# Patient Record
Sex: MALE | Race: WHITE | URBAN - METROPOLITAN AREA
[De-identification: names, ages, dates, MRNs, and addresses within clinical notes are randomized per-mention and may not be internally consistent; named-entity substitution may affect disease eponyms.]

---

## 2024-04-29 ENCOUNTER — TRANSCRIBE ORDERS (OUTPATIENT)
Dept: ADMINISTRATIVE | Facility: HOSPITAL | Age: 69
End: 2024-04-29

## 2024-04-29 DIAGNOSIS — Z82.49 FAMILY HISTORY OF ISCHEMIC HEART DISEASE: Primary | ICD-10-CM

## 2024-04-29 DIAGNOSIS — I10 ESSENTIAL HYPERTENSION, MALIGNANT: ICD-10-CM

## 2024-07-10 ENCOUNTER — HOSPITAL ENCOUNTER (OUTPATIENT)
Dept: CT IMAGING | Facility: HOSPITAL | Age: 69
Discharge: HOME | End: 2024-07-10

## 2024-07-10 ENCOUNTER — HOSPITAL ENCOUNTER (OUTPATIENT)
Dept: CARDIOLOGY | Facility: HOSPITAL | Age: 69
Discharge: HOME | End: 2024-07-10

## 2024-07-10 DIAGNOSIS — Z82.49 FAMILY HISTORY OF ISCHEMIC HEART DISEASE: ICD-10-CM

## 2024-07-10 DIAGNOSIS — I10 ESSENTIAL HYPERTENSION, MALIGNANT: ICD-10-CM

## 2024-07-10 LAB
BH CV VAS SCREENING CAROTID CCA LEFT: 71 CM/SEC
BH CV VAS SCREENING CAROTID CCA RIGHT: 79 CM/SEC
BH CV VAS SCREENING CAROTID ICA LEFT: 116 CM/SEC
BH CV VAS SCREENING CAROTID ICA RIGHT: 82 CM/SEC
BH CV XLRA MEAS - MID AO DIAM: 2.2 CM
BH CV XLRA MEAS - PAD LEFT ABI PT: 0.96
BH CV XLRA MEAS - PAD LEFT ARM: 142 MMHG
BH CV XLRA MEAS - PAD LEFT LEG PT: 137 MMHG
BH CV XLRA MEAS - PAD RIGHT ABI PT: 1.05
BH CV XLRA MEAS - PAD RIGHT ARM: 136 MMHG
BH CV XLRA MEAS - PAD RIGHT LEG PT: 149 MMHG
BH CV XLRA MEAS LEFT DIST CCA EDV: -25.2 CM/SEC
BH CV XLRA MEAS LEFT DIST CCA PSV: -71.3 CM/SEC
BH CV XLRA MEAS LEFT ICA/CCA RATIO: 1.6
BH CV XLRA MEAS LEFT PROX ICA EDV: -34.2 CM/SEC
BH CV XLRA MEAS LEFT PROX ICA PSV: -116 CM/SEC
BH CV XLRA MEAS RIGHT DIST CCA EDV: 21.7 CM/SEC
BH CV XLRA MEAS RIGHT DIST CCA PSV: 78.9 CM/SEC
BH CV XLRA MEAS RIGHT ICA/CCA RATIO: 1
BH CV XLRA MEAS RIGHT PROX ICA EDV: -25.2 CM/SEC
BH CV XLRA MEAS RIGHT PROX ICA PSV: -81.8 CM/SEC

## 2024-07-10 PROCEDURE — 75571 CT HRT W/O DYE W/CA TEST: CPT

## 2024-07-10 PROCEDURE — 93799 UNLISTED CV SVC/PROCEDURE: CPT

## 2024-07-22 ENCOUNTER — PATIENT ROUNDING (BHMG ONLY) (OUTPATIENT)
Dept: CARDIOLOGY | Facility: CLINIC | Age: 69
End: 2024-07-22

## 2024-07-22 ENCOUNTER — OFFICE VISIT (OUTPATIENT)
Dept: CARDIOLOGY | Facility: CLINIC | Age: 69
End: 2024-07-22
Payer: MEDICARE

## 2024-07-22 VITALS
HEART RATE: 64 BPM | DIASTOLIC BLOOD PRESSURE: 76 MMHG | BODY MASS INDEX: 25.62 KG/M2 | SYSTOLIC BLOOD PRESSURE: 117 MMHG | WEIGHT: 183 LBS | HEIGHT: 71 IN | OXYGEN SATURATION: 96 %

## 2024-07-22 DIAGNOSIS — I73.9 PAD (PERIPHERAL ARTERY DISEASE): ICD-10-CM

## 2024-07-22 DIAGNOSIS — R73.03 PREDIABETES: ICD-10-CM

## 2024-07-22 DIAGNOSIS — R73.9 HYPERGLYCEMIA: ICD-10-CM

## 2024-07-22 DIAGNOSIS — E78.5 DYSLIPIDEMIA: ICD-10-CM

## 2024-07-22 DIAGNOSIS — R06.09 DYSPNEA ON EXERTION: ICD-10-CM

## 2024-07-22 DIAGNOSIS — I25.10 CORONARY ARTERY CALCIFICATION SEEN ON CT SCAN: Primary | ICD-10-CM

## 2024-07-22 PROCEDURE — 1159F MED LIST DOCD IN RCRD: CPT | Performed by: INTERNAL MEDICINE

## 2024-07-22 PROCEDURE — 99204 OFFICE O/P NEW MOD 45 MIN: CPT | Performed by: INTERNAL MEDICINE

## 2024-07-22 PROCEDURE — 1160F RVW MEDS BY RX/DR IN RCRD: CPT | Performed by: INTERNAL MEDICINE

## 2024-07-22 PROCEDURE — 93000 ELECTROCARDIOGRAM COMPLETE: CPT | Performed by: INTERNAL MEDICINE

## 2024-07-22 RX ORDER — LOVASTATIN 20 MG/1
20 TABLET ORAL DAILY
COMMUNITY

## 2024-07-22 RX ORDER — ASPIRIN 81 MG/1
81 TABLET ORAL DAILY
COMMUNITY

## 2024-07-22 RX ORDER — GLUCOSAMINE SULFATE 500 MG
CAPSULE ORAL
COMMUNITY

## 2024-07-22 NOTE — PROGRESS NOTES
Cardiology Consult Note    Patient Identification:  Name: Jayden Richmond  Age: 68 y.o.  Sex: male  :  1955  MRN: [id number]             Requesting Physician :  Getachew Maxwell MD     Reason for Consultation / Chief Complaint :   CAD by CT calcium    History of Present Illness:      Mr. Jayden Garcia has PMH of    DJD  Hyperglycemia, prediabetes with hemoglobin A1c 6.3 according to patient  Dyslipidemia  Former smoker quit 1987    Here for abnormal CT calcium score.  Patient denies any chest pain.  Works as a [employer] in [employer].  States that he gets out of breath with extreme exercises which he used to not get when he was younger.    Patient's arterial blood pressure is 117/76, heart rate 64, O2 sat of 96% on room air.    Review of records:    CT calcium score 7/10/2024 revealed left main 0, LAD 94.5, LCx 217, RCA 0, total of 311.5.  2 nodules on right lower lobe along the fissures.    Vascular screening in 7/10/2024 revealed mild bilateral internal carotid artery disease      ECG 12 Lead    Date/Time: 2024 3:01 PM  Performed by: Jose Ramon Palmer MD    Authorized by: Jose Ramon Palmer MD  Comparison: not compared with previous ECG   Previous ECG: no previous ECG available  Comments: EKG done today reviewed/interpreted by me reveals sinus rhythm with a rate of 61 bpm, no comparison EKG available.           Labs from 3/18/2021 reveal BMP with a glucose of 105.  Normal CBC      Assessment:  :    CAD by CT calcium  Dyslipidemia  PAD with mild CAD artery disease  Hyperglycemia/prediabetes      Recommendations / Plan:        Reviewed records and reviewed results with patient.  Continue aspirin and statins as tolerated.  Get labs from PMD.  Follow-up with PMD for dyslipidemia and hyperglycemia  Will schedule a stress test to evaluate functional significance of CAD.  Will follow-up and consider further evaluation treatment depending on how his condition evolves.            Diagnosis Plan   1. Coronary artery calcification seen on CT scan  ECG 12 Lead    Stress Test With Myocardial Perfusion One Day      2. Dyspnea on exertion  Stress Test With Myocardial Perfusion One Day      3. Dyslipidemia  ECG 12 Lead    Stress Test With Myocardial Perfusion One Day      4. Hyperglycemia  ECG 12 Lead    Stress Test With Myocardial Perfusion One Day      5. PAD (peripheral artery disease)        6. Prediabetes                   Past Medical History:  Past Medical History:   Diagnosis Date    Diabetes mellitus     Hyperlipidemia      Past Surgical History:  Past Surgical History:   Procedure Laterality Date    BACK SURGERY      x 8    NOSE SURGERY      TOTAL HIP ARTHROPLASTY REVISION Bilateral       Allergies:  No Known Allergies  Home Meds:       Current Meds:     Current Outpatient Medications:     aspirin 81 MG EC tablet, Take 1 tablet by mouth Daily., Disp: , Rfl:     Glucosamine 500 MG capsule, Glucosamine, Disp: , Rfl:     lovastatin (MEVACOR) 20 MG tablet, Take 1 tablet by mouth Daily., Disp: , Rfl:   Social History:   Social History     Tobacco Use    Smoking status: Former     Current packs/day: 0.00     Average packs/day: 1 pack/day for 14.3 years (14.3 ttl pk-yrs)     Types: Cigarettes     Start date: 1973     Quit date: 1987     Years since quittin.1     Passive exposure: Past    Smokeless tobacco: Never    Tobacco comments:     it was around 12 to 14 years   Substance Use Topics    Alcohol use: Not Currently      Family History:  Family History   Problem Relation Age of Onset    Heart disease Mother         passed away at 79 with stroke    Heart attack Father         open heart about 12 years before falling and hit head and passed from the fall    Heart disease Father     Heart failure Father         Review of Systems : Review of Systems   Constitutional: Negative for fever and malaise/fatigue.   HENT:  Negative for congestion and hearing loss.    Eyes:  Negative for double  "vision and visual disturbance.   Cardiovascular:  Negative for chest pain, claudication, dyspnea on exertion, leg swelling, palpitations and syncope.   Respiratory:  Negative for cough and shortness of breath.    Endocrine: Negative for cold intolerance.   Skin:  Negative for color change and rash.   Musculoskeletal:  Negative for arthritis and joint pain.   Gastrointestinal:  Negative for abdominal pain and heartburn.   Genitourinary:  Negative for hematuria.   Neurological:  Negative for excessive daytime sleepiness and dizziness.   Psychiatric/Behavioral:  Negative for depression. The patient is not nervous/anxious.    All other systems reviewed and are negative.                Constitutional:  Heart Rate:  [64] 64  BP: (117)/(76) 117/76    Physical Exam   /76   Pulse 64   Ht 180.3 cm (71\")   Wt 83 kg (183 lb)   SpO2 96%   BMI 25.52 kg/m²   Physical Exam  General:  Appears in no acute distress  Eyes: Sclerae are anicteric,  conjunctivae are clear   HEENT:  No JVD. Thyroid not visibly enlarged. No mucosal pallor or cyanosis  Respiratory: Respirations regular and unlabored at rest.  Bilaterally good breath sounds with good air entry in all fields. No crackles, rubs or wheezes auscultated  Cardiovascular: S1,S2 Regular rate and rhythm. No murmur, rub or gallop auscultated. No pretibial pitting edema  Gastrointestinal: Abdomen soft, flat, nontender. Bowel sounds present.   Musculoskeletal:  No abnormal movements  Extremities: No digital clubbing or cyanosis  Skin: Color pink. Skin warm and dry to touch. No rashes  No xanthoma  Neuro: Alert and awake, no lateralizing deficits appreciated    Cardiographics  ECG: EKG tracing was  personally reviewed/interpreted by me  ECG 12 Lead    (Results Pending)       Telemetry:      Echocardiogram:       Imaging  Chest X-ray:   Imaging Results (Last 24 Hours)       ** No results found for the last 24 hours. **            Lab Review: I have reviewed the labs            "                           Jose Ramon Palmer MD  7/22/2024, 15:27 EDT      EMR Dragon/Transcription:   Dictated utilizing Dragon dictation

## 2024-07-22 NOTE — PROGRESS NOTES
A My-Chart message has been sent to the patient for PATIENT ROUNDING with [facility]

## 2024-07-31 ENCOUNTER — HOSPITAL ENCOUNTER (OUTPATIENT)
Dept: CARDIOLOGY | Facility: HOSPITAL | Age: 69
Discharge: HOME | End: 2024-07-31
Payer: MEDICARE

## 2024-07-31 ENCOUNTER — TELEPHONE (OUTPATIENT)
Dept: CARDIOLOGY | Facility: CLINIC | Age: 69
End: 2024-07-31
Payer: MEDICARE

## 2024-07-31 DIAGNOSIS — R73.9 HYPERGLYCEMIA: ICD-10-CM

## 2024-07-31 DIAGNOSIS — R06.09 DYSPNEA ON EXERTION: ICD-10-CM

## 2024-07-31 DIAGNOSIS — E78.5 DYSLIPIDEMIA: ICD-10-CM

## 2024-07-31 DIAGNOSIS — I25.10 CORONARY ARTERY CALCIFICATION SEEN ON CT SCAN: ICD-10-CM

## 2024-07-31 LAB
BH CV REST NUCLEAR ISOTOPE DOSE: 10.6 MCI
BH CV STRESS BP STAGE 1: NORMAL
BH CV STRESS BP STAGE 2: NORMAL
BH CV STRESS BP STAGE 3: NORMAL
BH CV STRESS BP STAGE 4: NORMAL
BH CV STRESS DURATION MIN STAGE 1: 3
BH CV STRESS DURATION MIN STAGE 2: 3
BH CV STRESS DURATION MIN STAGE 3: 3
BH CV STRESS DURATION MIN STAGE 4: 0
BH CV STRESS DURATION SEC STAGE 1: 0
BH CV STRESS DURATION SEC STAGE 2: 0
BH CV STRESS DURATION SEC STAGE 3: 0
BH CV STRESS DURATION SEC STAGE 4: 30
BH CV STRESS GRADE STAGE 1: 10
BH CV STRESS GRADE STAGE 2: 12
BH CV STRESS GRADE STAGE 3: 14
BH CV STRESS GRADE STAGE 4: 16
BH CV STRESS HR STAGE 1: 97
BH CV STRESS HR STAGE 2: 108
BH CV STRESS HR STAGE 3: 126
BH CV STRESS HR STAGE 4: 142
BH CV STRESS METS STAGE 1: 5
BH CV STRESS METS STAGE 2: 7.5
BH CV STRESS METS STAGE 3: 10
BH CV STRESS METS STAGE 4: 13.5
BH CV STRESS NUCLEAR ISOTOPE DOSE: 30 MCI
BH CV STRESS PROTOCOL 1: NORMAL
BH CV STRESS RECOVERY BP: NORMAL MMHG
BH CV STRESS RECOVERY HR: 90 BPM
BH CV STRESS SPEED STAGE 1: 1.7
BH CV STRESS SPEED STAGE 2: 2.5
BH CV STRESS SPEED STAGE 3: 3.4
BH CV STRESS SPEED STAGE 4: 4.2
BH CV STRESS STAGE 1: 1
BH CV STRESS STAGE 2: 2
BH CV STRESS STAGE 3: 3
BH CV STRESS STAGE 4: 4
LV EF NUC BP: 58 %
MAXIMAL PREDICTED HEART RATE: 152 BPM
PERCENT MAX PREDICTED HR: 93.42 %
STRESS BASELINE BP: NORMAL MMHG
STRESS BASELINE HR: 56 BPM
STRESS PERCENT HR: 110 %
STRESS POST EXERCISE DUR MIN: 9 MIN
STRESS POST EXERCISE DUR SEC: 30 SEC
STRESS POST PEAK BP: NORMAL MMHG
STRESS POST PEAK HR: 142 BPM
STRESS TARGET HR: 129 BPM

## 2024-07-31 PROCEDURE — 0 TECHNETIUM SESTAMIBI: Performed by: INTERNAL MEDICINE

## 2024-07-31 PROCEDURE — 93017 CV STRESS TEST TRACING ONLY: CPT

## 2024-07-31 PROCEDURE — A9500 TC99M SESTAMIBI: HCPCS | Performed by: INTERNAL MEDICINE

## 2024-07-31 PROCEDURE — 78452 HT MUSCLE IMAGE SPECT MULT: CPT

## 2024-07-31 RX ADMIN — TECHNETIUM TC 99M SESTAMIBI 1 DOSE: 1 INJECTION INTRAVENOUS at 11:17

## 2024-07-31 RX ADMIN — TECHNETIUM TC 99M SESTAMIBI 1 DOSE: 1 INJECTION INTRAVENOUS at 08:30

## 2025-08-27 ENCOUNTER — OFFICE VISIT (OUTPATIENT)
Dept: CARDIOLOGY | Facility: CLINIC | Age: 70
End: 2025-08-27
Payer: MEDICARE

## 2025-08-27 VITALS
SYSTOLIC BLOOD PRESSURE: 132 MMHG | HEART RATE: 71 BPM | OXYGEN SATURATION: 97 % | DIASTOLIC BLOOD PRESSURE: 76 MMHG | WEIGHT: 182 LBS | BODY MASS INDEX: 25.48 KG/M2 | HEIGHT: 71 IN

## 2025-08-27 DIAGNOSIS — R93.1 AGATSTON CORONARY ARTERY CALCIUM SCORE BETWEEN 100 AND 400: Primary | ICD-10-CM

## 2025-08-27 DIAGNOSIS — E78.5 DYSLIPIDEMIA: ICD-10-CM

## 2025-08-27 PROBLEM — Z71.2 ENCOUNTER TO DISCUSS TEST RESULTS: Status: ACTIVE | Noted: 2025-08-27

## 2025-08-27 PROCEDURE — 99213 OFFICE O/P EST LOW 20 MIN: CPT | Performed by: NURSE PRACTITIONER

## 2025-08-27 PROCEDURE — 1160F RVW MEDS BY RX/DR IN RCRD: CPT | Performed by: NURSE PRACTITIONER

## 2025-08-27 PROCEDURE — 93000 ELECTROCARDIOGRAM COMPLETE: CPT | Performed by: NURSE PRACTITIONER

## 2025-08-27 PROCEDURE — 1159F MED LIST DOCD IN RCRD: CPT | Performed by: NURSE PRACTITIONER

## 2025-08-27 RX ORDER — HYDROCHLOROTHIAZIDE 12.5 MG/1
1 CAPSULE ORAL TAKE AS DIRECTED
COMMUNITY
Start: 2025-08-25